# Patient Record
Sex: FEMALE | NOT HISPANIC OR LATINO | Employment: UNEMPLOYED | URBAN - METROPOLITAN AREA
[De-identification: names, ages, dates, MRNs, and addresses within clinical notes are randomized per-mention and may not be internally consistent; named-entity substitution may affect disease eponyms.]

---

## 2021-06-08 ENCOUNTER — IMMUNIZATION (OUTPATIENT)
Dept: FAMILY MEDICINE | Facility: CLINIC | Age: 31
End: 2021-06-08
Payer: COMMERCIAL

## 2021-06-08 ENCOUNTER — OFFICE VISIT (OUTPATIENT)
Dept: FAMILY MEDICINE | Facility: CLINIC | Age: 31
End: 2021-06-08
Payer: COMMERCIAL

## 2021-06-08 VITALS
TEMPERATURE: 98.5 F | DIASTOLIC BLOOD PRESSURE: 81 MMHG | HEART RATE: 76 BPM | WEIGHT: 138.4 LBS | RESPIRATION RATE: 20 BRPM | OXYGEN SATURATION: 99 % | SYSTOLIC BLOOD PRESSURE: 113 MMHG | HEIGHT: 58 IN | BODY MASS INDEX: 29.05 KG/M2

## 2021-06-08 DIAGNOSIS — J30.2 SEASONAL ALLERGIC RHINITIS, UNSPECIFIED TRIGGER: ICD-10-CM

## 2021-06-08 DIAGNOSIS — H60.8X1 OTHER OTITIS EXTERNA, RIGHT EAR: ICD-10-CM

## 2021-06-08 DIAGNOSIS — N91.2 ABSENCE OF MENSTRUATION: Primary | ICD-10-CM

## 2021-06-08 LAB — HCG UR QL: NEGATIVE

## 2021-06-08 PROCEDURE — 81025 URINE PREGNANCY TEST: CPT | Performed by: FAMILY MEDICINE

## 2021-06-08 PROCEDURE — 99214 OFFICE O/P EST MOD 30 MIN: CPT | Performed by: FAMILY MEDICINE

## 2021-06-08 PROCEDURE — 91301 PR COVID VAC MODERNA 100 MCG/0.5 ML IM: CPT

## 2021-06-08 PROCEDURE — 0011A PR COVID VAC MODERNA 100 MCG/0.5 ML IM: CPT

## 2021-06-08 RX ORDER — CETIRIZINE HYDROCHLORIDE 10 MG/1
10 TABLET ORAL
Qty: 30 TABLET | Refills: 1 | Status: SHIPPED | OUTPATIENT
Start: 2021-06-08 | End: 2022-01-24

## 2021-06-08 RX ORDER — CIPROFLOXACIN AND DEXAMETHASONE 3; 1 MG/ML; MG/ML
4 SUSPENSION/ DROPS AURICULAR (OTIC) 2 TIMES DAILY
Qty: 2 ML | Refills: 0 | Status: SHIPPED | OUTPATIENT
Start: 2021-06-08 | End: 2021-06-13

## 2021-06-08 RX ORDER — FLUTICASONE PROPIONATE 50 MCG
1 SPRAY, SUSPENSION (ML) NASAL DAILY
Qty: 16 G | Refills: 1 | Status: SHIPPED | OUTPATIENT
Start: 2021-06-08 | End: 2022-01-24

## 2021-06-08 SDOH — HEALTH STABILITY: MENTAL HEALTH: HOW MANY STANDARD DRINKS CONTAINING ALCOHOL DO YOU HAVE ON A TYPICAL DAY?: NOT ASKED

## 2021-06-08 SDOH — HEALTH STABILITY: MENTAL HEALTH: HOW OFTEN DO YOU HAVE 6 OR MORE DRINKS ON ONE OCCASION?: NEVER

## 2021-06-08 SDOH — HEALTH STABILITY: MENTAL HEALTH: HOW OFTEN DO YOU HAVE A DRINK CONTAINING ALCOHOL?: NEVER

## 2021-06-08 ASSESSMENT — MIFFLIN-ST. JEOR: SCORE: 1236.78

## 2021-06-21 ENCOUNTER — OFFICE VISIT (OUTPATIENT)
Dept: FAMILY MEDICINE | Facility: CLINIC | Age: 31
End: 2021-06-21
Payer: COMMERCIAL

## 2021-06-21 VITALS
WEIGHT: 137.8 LBS | HEIGHT: 59 IN | DIASTOLIC BLOOD PRESSURE: 83 MMHG | SYSTOLIC BLOOD PRESSURE: 116 MMHG | TEMPERATURE: 98.4 F | BODY MASS INDEX: 27.78 KG/M2 | HEART RATE: 86 BPM | OXYGEN SATURATION: 96 % | RESPIRATION RATE: 18 BRPM

## 2021-06-21 DIAGNOSIS — R06.2 WHEEZING: Primary | ICD-10-CM

## 2021-06-21 DIAGNOSIS — R30.0 DYSURIA: ICD-10-CM

## 2021-06-21 DIAGNOSIS — N92.6 IRREGULAR MENSES: ICD-10-CM

## 2021-06-21 LAB
BILIRUBIN UR: NEGATIVE MG/DL
BLOOD UR: NEGATIVE MG/DL
GLUCOSE URINE: NEGATIVE
KETONES UR QL: NEGATIVE MG/DL
LEUKOCYTE ESTERASE UR: NEGATIVE
NITRITE UR QL STRIP: NEGATIVE MG/DL
PH UR STRIP: 5.5 [PH] (ref 4.5–8)
PROTEIN UR: NEGATIVE MG/DL
SP GR UR STRIP: 1.01 (ref 1–1.03)
UROBILINOGEN UR STRIP-ACNC: NORMAL E.U./DL

## 2021-06-21 PROCEDURE — 99214 OFFICE O/P EST MOD 30 MIN: CPT | Mod: GC | Performed by: STUDENT IN AN ORGANIZED HEALTH CARE EDUCATION/TRAINING PROGRAM

## 2021-06-21 PROCEDURE — 81003 URINALYSIS AUTO W/O SCOPE: CPT | Performed by: STUDENT IN AN ORGANIZED HEALTH CARE EDUCATION/TRAINING PROGRAM

## 2021-06-21 RX ORDER — ALBUTEROL SULFATE 90 UG/1
2 AEROSOL, METERED RESPIRATORY (INHALATION) EVERY 6 HOURS
Qty: 18 G | Refills: 3 | Status: SHIPPED | OUTPATIENT
Start: 2021-06-21 | End: 2022-01-24

## 2021-06-21 ASSESSMENT — MIFFLIN-ST. JEOR: SCORE: 1249.69

## 2021-06-21 NOTE — PROGRESS NOTES
"       SUBJECTIVE       Mami Lyn is a 30 year old  female with a PMH     Irregular Period:  Mami is here today for an irregular period. She was initially seen on 6/8 for a missed period. Her period started 6/12 and stopped on 6/17. She also felt that the period was heavier than.     Chronic Cough:  She also reports that she has increased asthma symptoms in the last few weeks. She reports that symptoms are worse at night. She recently moved from [place] and reports that she was treated with an albuterol inhaler while living there. She denies any previously hospitalizations for her breathing. She has felt more fatigues    Dysuria:  Patient also reports burning with urination and some pelvic discomfort. No back or flank pain. No fever or chills. She reports a PMH of UTIs.     PMH, Medications and Allergies were reviewed and updated as needed.          OBJECTIVE     Vitals:    06/21/21 1100   BP: 116/83   Pulse: 86   Resp: 18   Temp: 98.4  F (36.9  C)   TempSrc: Oral   SpO2: 96%   Weight: 62.5 kg (137 lb 12.8 oz)   Height: 1.505 m (4' 11.25\")     Body mass index is 27.6 kg/m .    General :  healthy and alert, no distress  HEENT:  PERRLA, oropharynx is without erythema.   Cardiovascular: regular rate and rhythm, normal S1/S2 no other heart sounds  Respiratory:  CTA, normal respiratory effort  Musculoskeletal: no edema, moves all fours  Skin:   no lesions or rashes on exposed skin  Gastrointestinal:       abdomen soft, non-tender, non-distended, no organomegaly or masses, normal bowel sounds, no CVA tenderness.     No results found for this or any previous visit (from the past 24 hour(s)).    ASSESSMENT AND PLAN       Mami was seen today for menstrual problem.    Diagnoses and all orders for this visit:    Wheezing: Unclear diagnosis but will treat with albuterol today and schedule for PFTs. She will need a medical ride.   -     Pulmonary Function Test (PFT) Referral; Future  -     albuterol (PROAIR HFA/PROVENTIL HFA/VENTOLIN " HFA) 108 (90 Base) MCG/ACT inhaler; Inhale 2 puffs into the lungs every 6 hours    Dysuria: Today we will test the urine, she chose to wait for abx tx until the culture returns. We discussed that if she develops fever or flank/back pain she should follow-up sooner.   -     Urinalysis, Micro If ([facility])  -     Urine Culture ([facility])    Irregular menses  Reassured that periods can occasionally be irregular, and that a period lasting 5 days is reassuring.     F/u for CPE when able.    Discussed with MD Sunday Ramos MD PGY 3  [facility] [place]

## 2021-06-21 NOTE — NURSING NOTE
Due to patient being non-English speaking/uses sign language, an  was used for this visit. Only for face-to-face interpretation by an external agency, date and length of interpretation can be found on the scanned worksheet.     name:           ID:[id number]  Agency: [facility] [employer] Phone Interpreting  Language: Enma   Telephone number: [phone]  Type of interpretation: Telephone, spoken

## 2021-06-21 NOTE — LETTER
RETURN TO WORK/SCHOOL FORM    6/21/2021    Re: Mami Lyn  1990      To Whom It May Concern:     Mami Lyn was seen in clinic today..  She may return to work without restrictions on 6/22/21         Sunday Colorado MD  6/21/2021 11:32 AM

## 2021-06-21 NOTE — PROGRESS NOTES
"Preceptor attestation:  Vital signs reviewed: /83   Pulse 86   Temp 98.4  F (36.9  C) (Oral)   Resp 18   Ht 1.505 m (4' 11.25\")   Wt 62.5 kg (137 lb 12.8 oz)   LMP 06/11/2021 (Approximate)   SpO2 96%   BMI 27.60 kg/m      Patient seen, evaluated, and discussed with the resident.  I have verified the content of the note, which accurately reflects my assessment of the patient and the plan of care.    Supervising physician: Kena López MD  [facility]  "

## 2021-06-22 LAB — CULTURE: NORMAL

## 2021-06-23 NOTE — PATIENT INSTRUCTIONS
06/23/21   Pulmonary Function Test   [facility]  [address], [address]   [place] [address]  Phone: [phone]  Fax: [phone]    Fax referral, demographics, medication list and office notes to [phone]    Arely Barroso

## 2021-06-24 ENCOUNTER — AMBULATORY - HEALTHEAST (OUTPATIENT)
Dept: PULMONOLOGY | Facility: OTHER | Age: 31
End: 2021-06-24

## 2021-06-24 DIAGNOSIS — R05.3 CHRONIC COUGH: ICD-10-CM

## 2021-06-24 DIAGNOSIS — R06.2 WHEEZING: ICD-10-CM

## 2021-07-06 ENCOUNTER — IMMUNIZATION (OUTPATIENT)
Dept: FAMILY MEDICINE | Facility: CLINIC | Age: 31
End: 2021-07-06
Attending: FAMILY MEDICINE
Payer: COMMERCIAL

## 2021-07-06 PROCEDURE — 91301 PR COVID VAC MODERNA 100 MCG/0.5 ML IM: CPT

## 2021-07-06 PROCEDURE — 0012A PR COVID VAC MODERNA 100 MCG/0.5 ML IM: CPT

## 2022-01-24 ENCOUNTER — OFFICE VISIT (OUTPATIENT)
Dept: FAMILY MEDICINE | Facility: CLINIC | Age: 32
End: 2022-01-24

## 2022-01-24 VITALS
TEMPERATURE: 98.3 F | BODY MASS INDEX: 26.27 KG/M2 | WEIGHT: 131.2 LBS | RESPIRATION RATE: 18 BRPM | HEART RATE: 100 BPM | OXYGEN SATURATION: 95 % | SYSTOLIC BLOOD PRESSURE: 113 MMHG | DIASTOLIC BLOOD PRESSURE: 74 MMHG

## 2022-01-24 DIAGNOSIS — J30.2 SEASONAL ALLERGIC RHINITIS, UNSPECIFIED TRIGGER: ICD-10-CM

## 2022-01-24 DIAGNOSIS — R42 DIZZINESS: ICD-10-CM

## 2022-01-24 DIAGNOSIS — J45.20 MILD INTERMITTENT ASTHMA WITHOUT COMPLICATION: Primary | ICD-10-CM

## 2022-01-24 PROCEDURE — 99214 OFFICE O/P EST MOD 30 MIN: CPT | Mod: GC | Performed by: STUDENT IN AN ORGANIZED HEALTH CARE EDUCATION/TRAINING PROGRAM

## 2022-01-24 RX ORDER — FLUTICASONE PROPIONATE 50 MCG
1 SPRAY, SUSPENSION (ML) NASAL DAILY
Qty: 16 G | Refills: 1 | Status: SHIPPED | OUTPATIENT
Start: 2022-01-24 | End: 2022-03-28

## 2022-01-24 RX ORDER — CETIRIZINE HYDROCHLORIDE 10 MG/1
10 TABLET ORAL
Qty: 30 TABLET | Refills: 1 | Status: SHIPPED | OUTPATIENT
Start: 2022-01-24 | End: 2022-03-28

## 2022-01-24 RX ORDER — MECLIZINE HYDROCHLORIDE 25 MG/1
25 TABLET ORAL 3 TIMES DAILY PRN
Qty: 30 TABLET | Refills: 0 | Status: CANCELLED | OUTPATIENT
Start: 2022-01-24

## 2022-01-24 RX ORDER — BUDESONIDE AND FORMOTEROL FUMARATE DIHYDRATE 80; 4.5 UG/1; UG/1
2 AEROSOL RESPIRATORY (INHALATION) 2 TIMES DAILY PRN
Qty: 20.4 G | Refills: 11 | Status: SHIPPED | OUTPATIENT
Start: 2022-01-24 | End: 2022-03-07

## 2022-01-24 NOTE — LETTER
RETURN TO WORK/SCHOOL FORM    1/24/2022    Re: Mami Lyn  1990      To Whom It May Concern:     Mami Lyn was seen in clinic today.  She may return to work without restrictions on 1/25/22.           Arely Harris MD  1/24/2022 5:24 PM

## 2022-01-24 NOTE — PROGRESS NOTES
[facility] Clinic Visit    Assessment & Plan   1. Seasonal allergic rhinitis, unspecified trigger  Refilled.  - cetirizine (ZYRTEC) 10 MG tablet; Take 1 tablet (10 mg) by mouth nightly as needed (itching)  Dispense: 30 tablet; Refill: 1  - fluticasone (FLONASE) 50 MCG/ACT nasal spray; Spray 1 spray into both nostrils daily During allergy season  Dispense: 16 g; Refill: 1    2. Mild intermittent asthma without complication  Per RAMIRO guidelines, sent symbicort and discontinued albuterol.  - budesonide-formoterol (SYMBICORT) 80-4.5 MCG/ACT Inhaler; Inhale 2 puffs into the lungs 2 times daily as needed (for cough or wheezing)  Dispense: 20.4 g; Refill: 11    3. Dizziness  Patient presenting with 2-year history of dizziness that is worse with movement and described as room spinning.  I do suspect that this is vertigo, but given her longstanding history and no laboratory work-up in our system, would recommend basic labs and imaging. Ordered CBC/CMP/TSH to rule out anemia or electrolyte imbalance, thyroid dysfunction.  She also had question of dysmetria with finger-nose testing versus mild tremor, thus will obtain MRI brain.  Placed ENT referral given questionable hearing loss as well.  - CBC with platelets; Future  - TSH with free T4 reflex; Future  - Comprehensive metabolic panel; Future  - MR Brain w/o & w Contrast; Future  - Otolaryngology Referral; Future         Options for treatment and follow-up care were reviewed with the patient who was engaged and actively involved in the decision making process, verbalized understanding of the options discussed, and satisfied with the final plan.    Patient was staffed with supervising physician, Dr. Griffin.     Arely Harris MD, PGY3  [facility]    Subjective   Mami Lyn is a 31 year old female who presents with dizziness.    Dizziness/Vertigo  Onset: Developed when she moved up here from [place], approximately two years ago. She is dizzy all  "the time. It doesn't matter if she is sitting, standing, or walking. She feels dizzy sitting here and reports she can't look far because that makes the dizziness worse. She needs to  her glasses in three weeks.   What brings it on? Movement    Description:   Do you feel like you are going to faint?:  No   Does it feel like the surroundings (bed, room) are moving?:  YES   Have you felt unsteady/off balance?: No   Have you passed out or fallen?: No     Intensity: Moderate    Progression of Symptoms:  Worsening    Accompanying Signs & Symptoms:  No diarrhea or constipation.   Urinary Symptoms: No  Mood: No anxiety, stress, or depressed mood  Heart palpitations:  YES - sometimes  Nausea, vomiting:  YES - always the week before she gets her period, once she gets her period it goes away  Weakness in arms or legs: No   Fatigue:  YES - \"heart is heavy\"  Vision or speech changes:  YES - getting new glasses   Ringing in ears (Tinnitus): No   Hearing Loss:  YES - notices \"air in her ears\" and then can't hear    History:   Head trauma/concussion hx: No   Previous similar symptoms:  YES two year history  Recent bleeding history:  YES - last week and today had a nosebleed. Lasted approximately 5 minutes each time.   History of irregular periods. LMP on 1/19/22. She still has her period. It is heavy.    Precipitating factors:   Worse with activity or head movement?:  YES   Any new medications (BP?): No   Alcohol/drug abuse/withdrawal: No     Alleviating factors:   Does staying in a fixed position give relief?: Yes   Therapies Tried and outcome: Nothing    Needs refill on allergy and asthma medications. Only uses albuterol per patient. Uses as needed.     Social: She reports that she has never smoked. She uses smokeless tobacco. She reports that she does not drink alcohol and does not use drugs.    There are no exam notes on file for this visit.    Objective     Vitals:    01/24/22 1629   BP: 113/74   Pulse: 100   Resp: 18 "   Temp: 98.3  F (36.8  C)   TempSrc: Oral   SpO2: 95%   Weight: 59.5 kg (131 lb 3.2 oz)     Body mass index is 26.27 kg/m .    GEN: NAD, healthy, alert  Constitutional: Awake, alert, cooperative, no acute distress, and appears stated age.  HEENT: NC/AT, clear oropharynx, MMM  Eyes: EOMI, sclera clear, conjunctiva normal.  No nystagmus.  ENT: TMs clear bilaterally. Tonsils nonerythematous and without exudates or trismus.  Neck: Supple, symmetrical, trachea midline. No submandibular LAD.  Back: Symmetric, no curvature  Lungs: No increased WOB. CTABL, no crackles or wheezing appreciated.  Cardiovascular: Appears well perfused. RRR, normal S1 and S2, no S3 or S4, and no murmur appreciated.  Abdomen: Normal BS, soft, non-distended, non-tender, no masses palpated  Musculoskeletal: No redness, warmth, or swelling of the joints. Tone is normal.  Neurologic: A&Ox3.  Cranial nerves II-XII are intact.  Sensory is intact and gait is normal. Finger-nose-finger there is mild dysmetria.  Negative Romberg.  Neuropsychiatric: Normal affect, mood, orientation, memory and insight.  Skin: No visible rashes, erythema, pallor, petechia or purpura.

## 2022-01-24 NOTE — PROGRESS NOTES
Preceptor Attestation:    I discussed the patient with the resident and evaluated the patient in person. I have verified the content of the note, which accurately reflects my assessment of the patient and the plan of care.   Supervising Physician:  Donnie Griffin MD.

## 2022-02-11 DIAGNOSIS — H90.5 SENSORINEURAL HEARING LOSS (SNHL), UNSPECIFIED LATERALITY: Primary | ICD-10-CM

## 2022-02-23 ENCOUNTER — IMMUNIZATION (OUTPATIENT)
Dept: FAMILY MEDICINE | Facility: CLINIC | Age: 32
End: 2022-02-23
Payer: COMMERCIAL

## 2022-02-23 PROCEDURE — 0064A COVID-19,PF,MODERNA (18+ YRS BOOSTER .25ML): CPT

## 2022-02-23 PROCEDURE — 91306 COVID-19,PF,MODERNA (18+ YRS BOOSTER .25ML): CPT

## 2022-03-07 ENCOUNTER — OFFICE VISIT (OUTPATIENT)
Dept: FAMILY MEDICINE | Facility: CLINIC | Age: 32
End: 2022-03-07

## 2022-03-07 ENCOUNTER — ANCILLARY PROCEDURE (OUTPATIENT)
Dept: GENERAL RADIOLOGY | Facility: CLINIC | Age: 32
End: 2022-03-07
Attending: STUDENT IN AN ORGANIZED HEALTH CARE EDUCATION/TRAINING PROGRAM

## 2022-03-07 VITALS
OXYGEN SATURATION: 96 % | TEMPERATURE: 98.8 F | RESPIRATION RATE: 16 BRPM | DIASTOLIC BLOOD PRESSURE: 90 MMHG | BODY MASS INDEX: 24.03 KG/M2 | SYSTOLIC BLOOD PRESSURE: 148 MMHG | WEIGHT: 120 LBS | HEART RATE: 95 BPM

## 2022-03-07 DIAGNOSIS — R07.89 CHEST WALL PAIN: ICD-10-CM

## 2022-03-07 DIAGNOSIS — R07.9 CHEST PAIN, UNSPECIFIED TYPE: Primary | ICD-10-CM

## 2022-03-07 DIAGNOSIS — R42 DIZZINESS: ICD-10-CM

## 2022-03-07 DIAGNOSIS — D69.6 THROMBOCYTOPENIA (H): ICD-10-CM

## 2022-03-07 DIAGNOSIS — J45.20 MILD INTERMITTENT ASTHMA WITHOUT COMPLICATION: ICD-10-CM

## 2022-03-07 DIAGNOSIS — R07.9 CHEST PAIN, UNSPECIFIED TYPE: ICD-10-CM

## 2022-03-07 LAB
ALBUMIN SERPL-MCNC: 3.4 G/DL (ref 3.5–5)
ALP SERPL-CCNC: 122 U/L (ref 45–120)
ALT SERPL W P-5'-P-CCNC: 152 U/L (ref 0–45)
ANION GAP SERPL CALCULATED.3IONS-SCNC: 14 MMOL/L (ref 5–18)
AST SERPL W P-5'-P-CCNC: 295 U/L (ref 0–40)
BILIRUB SERPL-MCNC: 1 MG/DL (ref 0–1)
BUN SERPL-MCNC: 4 MG/DL (ref 8–22)
CALCIUM SERPL-MCNC: 8.7 MG/DL (ref 8.5–10.5)
CHLORIDE BLD-SCNC: 102 MMOL/L (ref 98–107)
CO2 SERPL-SCNC: 21 MMOL/L (ref 22–31)
CREAT SERPL-MCNC: 0.67 MG/DL (ref 0.6–1.1)
D DIMER PPP FEU-MCNC: 0.98 UG/ML FEU (ref 0–0.5)
ERYTHROCYTE [DISTWIDTH] IN BLOOD BY AUTOMATED COUNT: 12.2 % (ref 10–15)
GFR SERPL CREATININE-BSD FRML MDRD: >90 ML/MIN/1.73M2
GLUCOSE BLD-MCNC: 124 MG/DL (ref 70–125)
HCT VFR BLD AUTO: 40.4 % (ref 35–47)
HGB BLD-MCNC: 13.5 G/DL (ref 11.7–15.7)
MCH RBC QN AUTO: 32.1 PG (ref 26.5–33)
MCHC RBC AUTO-ENTMCNC: 33.4 G/DL (ref 31.5–36.5)
MCV RBC AUTO: 96 FL (ref 78–100)
PLATELET # BLD AUTO: 128 10E3/UL (ref 150–450)
POTASSIUM BLD-SCNC: 4.1 MMOL/L (ref 3.5–5)
PROT SERPL-MCNC: 8.2 G/DL (ref 6–8)
RBC # BLD AUTO: 4.2 10E6/UL (ref 3.8–5.2)
SODIUM SERPL-SCNC: 137 MMOL/L (ref 136–145)
TROPONIN I SERPL-MCNC: <0.01 NG/ML (ref 0–0.29)
TSH SERPL DL<=0.005 MIU/L-ACNC: 1.15 UIU/ML (ref 0.3–5)
WBC # BLD AUTO: 3.6 10E3/UL (ref 4–11)

## 2022-03-07 PROCEDURE — 84443 ASSAY THYROID STIM HORMONE: CPT | Performed by: STUDENT IN AN ORGANIZED HEALTH CARE EDUCATION/TRAINING PROGRAM

## 2022-03-07 PROCEDURE — 71101 X-RAY EXAM UNILAT RIBS/CHEST: CPT | Mod: RT | Performed by: RADIOLOGY

## 2022-03-07 PROCEDURE — 36415 COLL VENOUS BLD VENIPUNCTURE: CPT | Performed by: STUDENT IN AN ORGANIZED HEALTH CARE EDUCATION/TRAINING PROGRAM

## 2022-03-07 PROCEDURE — 85379 FIBRIN DEGRADATION QUANT: CPT | Performed by: STUDENT IN AN ORGANIZED HEALTH CARE EDUCATION/TRAINING PROGRAM

## 2022-03-07 PROCEDURE — 85027 COMPLETE CBC AUTOMATED: CPT | Performed by: STUDENT IN AN ORGANIZED HEALTH CARE EDUCATION/TRAINING PROGRAM

## 2022-03-07 PROCEDURE — 99214 OFFICE O/P EST MOD 30 MIN: CPT | Mod: GC | Performed by: STUDENT IN AN ORGANIZED HEALTH CARE EDUCATION/TRAINING PROGRAM

## 2022-03-07 PROCEDURE — 80053 COMPREHEN METABOLIC PANEL: CPT | Performed by: STUDENT IN AN ORGANIZED HEALTH CARE EDUCATION/TRAINING PROGRAM

## 2022-03-07 PROCEDURE — 93000 ELECTROCARDIOGRAM COMPLETE: CPT | Performed by: STUDENT IN AN ORGANIZED HEALTH CARE EDUCATION/TRAINING PROGRAM

## 2022-03-07 PROCEDURE — 84484 ASSAY OF TROPONIN QUANT: CPT | Performed by: STUDENT IN AN ORGANIZED HEALTH CARE EDUCATION/TRAINING PROGRAM

## 2022-03-07 RX ORDER — BUDESONIDE AND FORMOTEROL FUMARATE DIHYDRATE 80; 4.5 UG/1; UG/1
AEROSOL RESPIRATORY (INHALATION)
Qty: 20.4 G | Refills: 11 | Status: SHIPPED | OUTPATIENT
Start: 2022-03-07 | End: 2022-03-28

## 2022-03-07 RX ORDER — NAPROXEN 500 MG/1
500 TABLET ORAL 2 TIMES DAILY WITH MEALS
Qty: 14 TABLET | Refills: 0 | Status: SHIPPED | OUTPATIENT
Start: 2022-03-07 | End: 2022-03-28

## 2022-03-07 NOTE — PROGRESS NOTES
Preceptor Attestation:    I discussed the patient with the resident and evaluated the patient in person. I have verified the content of the note, which accurately reflects my assessment of the patient and the plan of care.   Supervising Physician:  Donnie Griffin MD.  Preceptor Attestation:   I personally reviewed the imaging and agree with the interpretation documented by the resident. I personally viewed the EKG and agree with the interpretation documented by the resident. I have verified the content of the note, which accurately reflects my assessment of the patient and the plan of care.   Supervising Physician:  Donnie Griffin MD.
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
## 2022-03-07 NOTE — Clinical Note
Please call the patient to assist with scheduling the following things:  1. Brain MRI (ordered at last visit)  2. ENT visit (referral from last visit)  3. Follow up chest pain with Dr. Varghese or Dr. Harris in 1 week    Thank you so much Georgia!    JS

## 2022-03-07 NOTE — Clinical Note
KOURTNEY -- you saw this patient recently. She came into today for chest pain and her whole body was shaking throughout the exam. Anyway, thought you might be interested in seeing lab results as they come through. I will have Georgia call to see if she can follow up with me or you in the next week to review labs and symptoms.     OCTAVIANO

## 2022-03-07 NOTE — NURSING NOTE
Due to patient being non-English speaking/uses sign language, an  was used for this visit. Only for face-to-face interpretation by an external agency, date and length of interpretation can be found on the scanned worksheet.     name: [id number]  Agency:  [facility] [employer] [facility]    Language: Enma   Telephone number: [phone]  Type of interpretation: Telephone, spoken

## 2022-03-07 NOTE — LETTER
March 7, 2022      Mami Lyn  [address]   [place] [address]              To whom it may concern,    Mami Lyn was seen at [facility] on 3/7/22. She will need 1/2 day off of work for necessary medical imaging.           Sincerely,      Trish Varghese MD

## 2022-03-08 ENCOUNTER — HOSPITAL ENCOUNTER (OUTPATIENT)
Dept: CT IMAGING | Facility: CLINIC | Age: 32
Discharge: HOME | End: 2022-03-08
Attending: STUDENT IN AN ORGANIZED HEALTH CARE EDUCATION/TRAINING PROGRAM | Admitting: STUDENT IN AN ORGANIZED HEALTH CARE EDUCATION/TRAINING PROGRAM
Payer: COMMERCIAL

## 2022-03-08 DIAGNOSIS — R07.9 CHEST PAIN, UNSPECIFIED TYPE: ICD-10-CM

## 2022-03-08 DIAGNOSIS — R79.89 POSITIVE D-DIMER: Primary | ICD-10-CM

## 2022-03-08 DIAGNOSIS — R79.89 POSITIVE D-DIMER: ICD-10-CM

## 2022-03-08 PROCEDURE — 71275 CT ANGIOGRAPHY CHEST: CPT

## 2022-03-08 PROCEDURE — 250N000011 HC RX IP 250 OP 636: Performed by: STUDENT IN AN ORGANIZED HEALTH CARE EDUCATION/TRAINING PROGRAM

## 2022-03-08 RX ORDER — IOPAMIDOL 755 MG/ML
100 INJECTION, SOLUTION INTRAVASCULAR ONCE
Status: COMPLETED | OUTPATIENT
Start: 2022-03-08 | End: 2022-03-08

## 2022-03-08 RX ADMIN — IOPAMIDOL 100 ML: 755 INJECTION, SOLUTION INTRAVENOUS at 16:40

## 2022-03-08 NOTE — PROGRESS NOTES
Able to reach patient. Scheduled for CT:    Today, 3/8/22 4:20 pm  [address]   [facility] [address]    Patient has transportation. ./LR

## 2022-03-08 NOTE — PROGRESS NOTES
Chest pain, low prob, positive ddimer. Next step in work up is CTA chest to check for PE.    1. Positive D-dimer  2. Chest pain, unspecified type  Needs outpatient CT PE urgently to rule out PE. Because low risk and stable vitals yesterday I do not believe the patient needs to be seen in the ER emergently unless symptoms progress or change.   - CT Chest Pulmonary Embolism w Contrast; Future  -  If CT positive is positive for PE, would consider starting apixiban 10 mg twice daily for 7 days followed by 5 mg twice daily. NOTE: patient did have abnormal LFTs and thrombocytopenia on labs yesterday. These labs place patient in Child-Burciaga class A or B (do not have PT/INR available). Should be okay to start apixiban.     3. Elevated liver function tests  - Needs close outpatient follow up with Dr. Varghese or Dr. Harris      7:54 am: Called patient with [employer] . Unable to reach patient. Left VM message to call the clinic back. Will route to RN and referral coordinator to try to coordinate urgent imaging.     Discussed with attending physician, Dr. Yañez.    Trish Varghese MD PGY3  [facility]  3/8/2022, 8:04 AM

## 2022-03-08 NOTE — PROGRESS NOTES
Called home number x 2 ([phone]), no answer and phone went straight to voicemil. Called patient contact,  Rox Thein, phone continued to ring without opportunity to leave message.    Spoke with  Bj Herrera who knows the family. He will try to get in touch with them as well. ./LR

## 2022-03-09 NOTE — RESULT ENCOUNTER NOTE
Called patient with Enma  to discuss results. Also reviewed the TSH, CBC, and CMP results that were also collected yesterday.   CMP was significant for elevated LFTs. Recommended close follow up to discuss next steps.     CBC significant for thrombocytopenia.

## 2022-03-15 ENCOUNTER — OFFICE VISIT (OUTPATIENT)
Dept: FAMILY MEDICINE | Facility: CLINIC | Age: 32
End: 2022-03-15

## 2022-03-15 VITALS
SYSTOLIC BLOOD PRESSURE: 118 MMHG | RESPIRATION RATE: 16 BRPM | HEART RATE: 87 BPM | TEMPERATURE: 98.1 F | WEIGHT: 120 LBS | BODY MASS INDEX: 24.03 KG/M2 | OXYGEN SATURATION: 94 % | DIASTOLIC BLOOD PRESSURE: 83 MMHG

## 2022-03-15 DIAGNOSIS — R94.5 ABNORMAL RESULTS OF LIVER FUNCTION STUDIES: ICD-10-CM

## 2022-03-15 DIAGNOSIS — L03.116 CELLULITIS OF LEFT LOWER EXTREMITY: Primary | ICD-10-CM

## 2022-03-15 PROCEDURE — 87186 SC STD MICRODIL/AGAR DIL: CPT | Performed by: STUDENT IN AN ORGANIZED HEALTH CARE EDUCATION/TRAINING PROGRAM

## 2022-03-15 PROCEDURE — 87205 SMEAR GRAM STAIN: CPT | Performed by: STUDENT IN AN ORGANIZED HEALTH CARE EDUCATION/TRAINING PROGRAM

## 2022-03-15 PROCEDURE — 99214 OFFICE O/P EST MOD 30 MIN: CPT | Mod: GC | Performed by: STUDENT IN AN ORGANIZED HEALTH CARE EDUCATION/TRAINING PROGRAM

## 2022-03-15 PROCEDURE — 87070 CULTURE OTHR SPECIMN AEROBIC: CPT | Performed by: STUDENT IN AN ORGANIZED HEALTH CARE EDUCATION/TRAINING PROGRAM

## 2022-03-15 PROCEDURE — 87077 CULTURE AEROBIC IDENTIFY: CPT | Performed by: STUDENT IN AN ORGANIZED HEALTH CARE EDUCATION/TRAINING PROGRAM

## 2022-03-15 RX ORDER — DOXYCYCLINE HYCLATE 100 MG
100 TABLET ORAL 2 TIMES DAILY
Qty: 10 TABLET | Refills: 0 | Status: SHIPPED | OUTPATIENT
Start: 2022-03-15 | End: 2022-03-20

## 2022-03-15 NOTE — NURSING NOTE
Due to patient being non-English speaking/uses sign language, an  was used for this visit. Only for face-to-face interpretation by an external agency, date and length of interpretation can be found on the scanned worksheet.       name: Bj Herrera (Htoo)  Language: Enma  Agency:  [employer]  Phone number: [phone]  Type of interpretation:  Face-to-face, spoken

## 2022-03-15 NOTE — PROGRESS NOTES
Preceptor Attestation:    I discussed the patient with the resident and evaluated the patient in person. I have verified the content of the note, which accurately reflects my assessment of the patient and the plan of care.   Supervising Physician:  Gregory Yañez MD.

## 2022-03-15 NOTE — PROGRESS NOTES
Assessment and Plan   Mami was seen today for wound check and follow-up from her previous visits.  Shallow ulcer on the right calf, 1.5 x 1 inches, tender to touch and surrounded by erythema, no discharge, some granulation tissues on the base of the ulcer.  Multiple pinpoint bug bite on her lower extremities bilateral hand back with some excoriation.  Wound culture was requested for MRSA.  Chest MRI was done recently due to the chest pain which did not reveal PE although D-dimer was mildly elevated.  Chest x-ray did not reveal fracture ribs.  Patient has increased LFT, AST 2 times ALT with low albumin which might suggest it excessive alcohol intake, patient denies.  Patient needs hepatitis C and B testing.  Also needs GGT.  Low platelet might be secondary to toxic effect of alcohol  Chest pain most likely due to muscle strain, patient had complete range of motion.  Also patient had an issue with receiving have prescribed medication due to insurance.  SW was consulted.  Diagnoses and all orders for this visit:    Cellulitis of left lower extremity  -     doxycycline hyclate (VIBRA-TABS) 100 MG tablet; Take 1 tablet (100 mg) by mouth 2 times daily for 5 days  -     Abscess Aerobic Bacterial Culture Routine with Gram Stain    Abnormal results of liver function studies  -     Hepatitis B Surface Antibody  -     Hepatitis B surface antigen  -     Hepatitis B core antibody    Other orders  -     GGT; Standing  -     GGT             Options for treatment and follow-up care were reviewed with the patient. Patient engaged in the decision making process and verbalized understanding of the options discussed and agreed with the final plan.    Patient was staffed with attending physician MD Americo Rock MD PGY2           HPI       Mami Thaw is a 31 year old year old female w/ PMH of   Patient Active Problem List   Diagnosis     Thrombocytopenia (H)    who presents for leg pain and follow-up for the  previous visit lab work.  Patient complaining of right calf pain that started this week.  Initially started as pimple which is open with some clear discharge and produce shallow skin ulcer.  Patient has no history of trauma.  The area is red and painful.  Denies fever or chills.  Patient endorses itching and bug bites on her lower extremities bilateral and on her back.  Regarding shoulder pain that started 2 weeks ago, mainly the right shoulder and right chest that is started after having Covid third shot.  Her pain usually exacerbated with cold and heat, it is intermittent.name.  Patient has a multiple history of heavy lifting using her right arm.        A [employer]  was used for  this visit.    +++++++      Problem, Medication and Allergy Lists were   reviewed and updated if needed.     Patient Active Problem List    Diagnosis Date Noted     Thrombocytopenia (H) 03/07/2022     Priority: Medium     Platelet 128. Found incidentally with work up of chest pain. Needs follow up.          Patient is an established patient of this clinic.         Review of Systems:   10 point ROS negative other than as specified above.         Physical Exam:   /83   Pulse 87   Temp 98.1  F (36.7  C) (Oral)   Resp 16   Wt 54.4 kg (120 lb)   SpO2 94%   BMI 24.03 kg/m      Vitals were reviewed and were normal     Exam:  Constitutional: healthy, alert, no distress, and cooperative  Head: Normocephalic. No masses, lesions, tenderness or abnormalities  Neck: Neck supple. No adenopathy.  ENT: throat normal without erythema or exudate  Cardiovascular: RRR w/o audible murmur  Respiratory: bilateral clear lungs w/o wheezing, crackles or rhonchi; breathing comfortably on RA  Gastrointestinal: Abdomen soft, non-tender. BS normal.  : Deferred  Musculoskeletal: extremities normal- no gross deformities noted,   Skin: Multiple bug bites on the lower extremities bilateral and the back with some sign of excoriation.  Shallow ulcer,  1.5 x 1 inches, erythema around the ulcer, some granulation tissues on the base of the ulcer  Neurologic: grossly normal CN; normal strength, sensation, & tone  Psychiatric: mentation appears normal and affect normal/bright        Results:    Results from this visitNo results found for any visits on 03/15/22.

## 2022-03-15 NOTE — LETTER
[facility] [address] [place] [address]  Phone: [phone]  Fax: [phone]    March 15, 2022        [address]   [place] [address]          To whom it may concern:    RE: Mami Lyn    Patient was seen and treated today at our clinic with her  who provided her with transportation and both  missed work.    Please contact me for questions or concerns.      Sincerely,        Americo Esparza MD

## 2022-03-19 LAB
BACTERIA ABSC ANAEROBE+AEROBE CULT: ABNORMAL
GRAM STAIN RESULT: ABNORMAL
GRAM STAIN RESULT: ABNORMAL

## 2022-03-23 ENCOUNTER — TELEPHONE (OUTPATIENT)
Dept: FAMILY MEDICINE | Facility: CLINIC | Age: 32
End: 2022-03-23
Payer: COMMERCIAL

## 2022-03-23 NOTE — TELEPHONE ENCOUNTER
03/23/2022: Care Coordination     I was asked to assist Ms. Lyn this morning who is having trouble getting her medications due to her insurance showing not being active.  I When I look her insurance up on Epic, it show as being current as [facility] FIORELLA care subscriber ID:[id number]   [employer] [id number]     But when I [place] up on [place], no [place] ID shows up so I called [facility] and was told that her plan was active as of 1/1/22. I was told that the pharmacy is entering her date of birth incorrect as:[birth year] instead of 1990. I then called the pharmacy who reported that they are entering the correct date of birth and that they had the wrong subscriber ID. The pharmacist stated that he will call [facility] when he get's more staff in to get Ms. Lyn her medications. He also stated that he would contact Ms. Lyn. I also called Ms. Lyn but was forced to leave a message.           Vance Choe Sr.  Care Coordinator  [address] [address]  [email or web address]   Office: [phone]  Direct: [phone]  [facility]

## 2022-03-28 ENCOUNTER — TELEPHONE (OUTPATIENT)
Dept: FAMILY MEDICINE | Facility: CLINIC | Age: 32
End: 2022-03-28

## 2022-03-28 ENCOUNTER — OFFICE VISIT (OUTPATIENT)
Dept: FAMILY MEDICINE | Facility: CLINIC | Age: 32
End: 2022-03-28
Payer: COMMERCIAL

## 2022-03-28 VITALS
BODY MASS INDEX: 23.95 KG/M2 | OXYGEN SATURATION: 93 % | TEMPERATURE: 98.1 F | DIASTOLIC BLOOD PRESSURE: 85 MMHG | HEART RATE: 78 BPM | WEIGHT: 119.6 LBS | SYSTOLIC BLOOD PRESSURE: 130 MMHG | RESPIRATION RATE: 16 BRPM

## 2022-03-28 DIAGNOSIS — M25.551 HIP PAIN, RIGHT: Primary | ICD-10-CM

## 2022-03-28 DIAGNOSIS — L03.115 CELLULITIS OF RIGHT LOWER EXTREMITY: ICD-10-CM

## 2022-03-28 DIAGNOSIS — J30.2 SEASONAL ALLERGIC RHINITIS, UNSPECIFIED TRIGGER: ICD-10-CM

## 2022-03-28 DIAGNOSIS — J45.20 MILD INTERMITTENT ASTHMA WITHOUT COMPLICATION: ICD-10-CM

## 2022-03-28 PROCEDURE — 99214 OFFICE O/P EST MOD 30 MIN: CPT | Mod: GC | Performed by: STUDENT IN AN ORGANIZED HEALTH CARE EDUCATION/TRAINING PROGRAM

## 2022-03-28 RX ORDER — FLUTICASONE PROPIONATE 50 MCG
1 SPRAY, SUSPENSION (ML) NASAL DAILY
Qty: 16 G | Refills: 1 | Status: SHIPPED | OUTPATIENT
Start: 2022-03-28

## 2022-03-28 RX ORDER — ACETAMINOPHEN 500 MG
500-1000 TABLET ORAL EVERY 6 HOURS PRN
Qty: 30 TABLET | Refills: 1 | Status: SHIPPED | OUTPATIENT
Start: 2022-03-28

## 2022-03-28 RX ORDER — NAPROXEN 500 MG/1
500 TABLET ORAL 2 TIMES DAILY WITH MEALS
Qty: 14 TABLET | Refills: 0 | Status: SHIPPED | OUTPATIENT
Start: 2022-03-28

## 2022-03-28 RX ORDER — BUDESONIDE AND FORMOTEROL FUMARATE DIHYDRATE 80; 4.5 UG/1; UG/1
AEROSOL RESPIRATORY (INHALATION)
Qty: 20.4 G | Refills: 11 | Status: SHIPPED | OUTPATIENT
Start: 2022-03-28

## 2022-03-28 RX ORDER — CETIRIZINE HYDROCHLORIDE 10 MG/1
10 TABLET ORAL
Qty: 30 TABLET | Refills: 1 | Status: SHIPPED | OUTPATIENT
Start: 2022-03-28 | End: 2022-03-31

## 2022-03-28 NOTE — TELEPHONE ENCOUNTER
03/28/2022: Care Coordination     Ms. Lyn came to the office 30 minutes early today so that we could call [place] [facility] to correct the date of birth on her insurance card so that she can get her medications. We called [place] [facility] as well as [facility] only to be told that she will need to she would need to go to the [facility] located at [address] [place], [place] [address] to take care of this problem. As the bldg on [address] is temporally closed.  Ms. Lyn stated that she will go there in the morning.                 Vance Choe [place] [address]  [email or web address]   Office: [phone]  Direct: [phone]  [facility]

## 2022-03-28 NOTE — LETTER
RETURN TO WORK/SCHOOL FORM    3/28/2022    Re: Mami Lyn  1990      To Whom It May Concern:     Mami Lyn was seen in clinic today and her partner Brando Randall.  She may return to work without restrictions on 3/29/22          Restrictions:  None full duty      Sarwat Rasheed MD  3/28/2022 4:44 PM

## 2022-03-28 NOTE — NURSING NOTE
Due to patient being non-English speaking/uses sign language, an  was used for this visit. Only for face-to-face interpretation by an external agency, date and length of interpretation can be found on the scanned worksheet.     name: PB DESAI   Agency: [employer]  Language: Enma   Telephone number:   Type of interpretation: Face-to-face, spoken
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
## 2022-03-31 ENCOUNTER — OFFICE VISIT (OUTPATIENT)
Dept: FAMILY MEDICINE | Facility: CLINIC | Age: 32
End: 2022-03-31

## 2022-03-31 VITALS
OXYGEN SATURATION: 98 % | HEART RATE: 86 BPM | DIASTOLIC BLOOD PRESSURE: 88 MMHG | BODY MASS INDEX: 23.23 KG/M2 | WEIGHT: 116 LBS | TEMPERATURE: 98.5 F | SYSTOLIC BLOOD PRESSURE: 121 MMHG | RESPIRATION RATE: 16 BRPM

## 2022-03-31 DIAGNOSIS — M54.6 ACUTE BILATERAL THORACIC BACK PAIN: Primary | ICD-10-CM

## 2022-03-31 DIAGNOSIS — R07.9 CHEST PAIN, UNSPECIFIED TYPE: ICD-10-CM

## 2022-03-31 DIAGNOSIS — R10.13 DYSPEPSIA: ICD-10-CM

## 2022-03-31 DIAGNOSIS — L29.9 ITCHING: ICD-10-CM

## 2022-03-31 PROCEDURE — 99214 OFFICE O/P EST MOD 30 MIN: CPT | Mod: GC | Performed by: STUDENT IN AN ORGANIZED HEALTH CARE EDUCATION/TRAINING PROGRAM

## 2022-03-31 RX ORDER — TRIAMCINOLONE ACETONIDE 1 MG/G
CREAM TOPICAL
Qty: 30 G | Refills: 11 | Status: SHIPPED | OUTPATIENT
Start: 2022-03-31

## 2022-03-31 RX ORDER — HYDROXYZINE HYDROCHLORIDE 25 MG/1
25 TABLET, FILM COATED ORAL 3 TIMES DAILY PRN
Qty: 90 TABLET | Refills: 0 | Status: SHIPPED | OUTPATIENT
Start: 2022-03-31

## 2022-03-31 RX ORDER — CALCIUM CARBONATE 500 MG/1
1 TABLET, CHEWABLE ORAL 2 TIMES DAILY
Qty: 90 TABLET | Refills: 0 | Status: SHIPPED | OUTPATIENT
Start: 2022-03-31

## 2022-03-31 RX ORDER — TRIAMCINOLONE ACETONIDE 1 MG/G
CREAM TOPICAL 2 TIMES DAILY
Qty: 15 G | Refills: 11 | Status: SHIPPED | OUTPATIENT
Start: 2022-03-31 | End: 2022-03-31

## 2022-03-31 RX ORDER — DOXYCYCLINE HYCLATE 100 MG
TABLET ORAL
COMMUNITY
Start: 2022-03-23

## 2022-03-31 NOTE — PROGRESS NOTES
"  Assessment & Plan     1. Acute bilateral thoracic back pain  2. Chest pain, unspecified type  3. Dyspepsia  Unclear etiology. She does have a history of recurrent chest pain and had an outpatient ACS rule out about a month ago where the EKG was reassuring, with no signs of acute ischemic changes. Trop was negative, but ddimer was elevated. Follow up chest CT was negative for PE. Vitals today were reassuring, however unlike last presentation I was unable to reproduce the pain with palpation. Discussed red flag symptoms that would indicate need for her to go to the emergency department.   - calcium carbonate (TUMS) 500 MG chewable tablet; Take 1 tablet (500 mg) by mouth 2 times daily  Dispense: 90 tablet; Refill: 0  - Close follow up indicated; should be seen by me next week   - Go to the emergency department if worsening chest pain  - If continued chest, back pain--consider CMP (f/u elevated liver enzymes), lipid panel, lipase, GAD7 & PHQ9.     4. Itching  ?atopic dermatitis on the back. Will provide symptomatic treatment.   - hydrOXYzine (ATARAX) 25 MG tablet; Take 1 tablet (25 mg) by mouth 3 times daily as needed for itching or anxiety  Dispense: 90 tablet; Refill: 0  - triamcinolone (KENALOG) 0.1 % external cream; Apply topically to your back 2 times per day.  Dispense: 30 g; Refill: 11      Diagnosis or treatment significantly limited by social determinants of health - Limited English proficiency  Prescription drug management     BMI:   Estimated body mass index is 23.23 kg/m  as calculated from the following:    Height as of 6/21/21: 1.505 m (4' 11.25\").    Weight as of this encounter: 52.6 kg (116 lb).     Patient was seen by and discussed with attending physician, Dr. Gardner.     Trish Varghese MD  [facility]   Mami is a 31 year old who presents for the following health issues     HPI     Pt is here to follow up on burning pain in her back, right side of her chest, " and epigastric/abdomen. This has been occurring for 2 days. It first starts in her back, then to her chest, then to her belly. The pain is not associated with eating.     She has not tried anything to help with the burning pain. She has an inhaler that helps with the burning pain but not for too long.     Review of Systems   Constitutional, HEENT, cardiovascular, pulmonary, gi and gu systems are negative, except as otherwise noted.      Objective    /88 (BP Location: Left arm, Patient Position: Sitting, Cuff Size: Adult Regular)   Pulse 86   Temp 98.5  F (36.9  C) (Oral)   Resp 16   Wt 52.6 kg (116 lb)   SpO2 98%   BMI 23.23 kg/m    Body mass index is 23.23 kg/m .  Physical Exam   GENERAL: alert and no distress  RESP: lungs clear to auscultation - no rales, rhonchi or wheezes  CV: regular rate and rhythm, normal S1 S2, no S3 or S4, no murmur, click or rub, no peripheral edema and peripheral pulses strong.  MS: Does have a mild tremor. No tenderness to palpation of chest wall and paraspinal muscles. No gross musculoskeletal defects noted, no edema  Skin: Many small darkened, scarred spots on the back. Extensive excoriations on the back (see below)        ----- Service Performed and Documented by Resident or Fellow ------

## 2022-03-31 NOTE — PATIENT INSTRUCTIONS
KENALOG CREAM - For itching. Use 2 times a day on the back.     TUMS/CALCIUM CARBONATE - For burning pain in stomach. Use 1-2 times per day as needed.    HYDROXYZINE - Take 1 tablet up to 3 times a day for itching or anxiety.

## 2022-03-31 NOTE — PROGRESS NOTES
Preceptor Attestation:  Vitals:    03/31/22 1632   BP: 121/88   BP Location: Left arm   Patient Position: Sitting   Cuff Size: Adult Regular   Pulse: 86   Resp: 16   Temp: 98.5  F (36.9  C)   TempSrc: Oral   SpO2: 98%   Weight: 52.6 kg (116 lb)          I discussed the patient with the resident and evaluated the patient in person. I have verified the content of the note, which accurately reflects my assessment of the patient and the plan of care.   Supervising Physician:  Loren Gardner MD

## 2022-03-31 NOTE — LETTER
March 31, 2022      Mami DANIELS   [place] [address]        To Whom It May Concern,     Mami Lyn attended clinic here on Mar 31, 2022. Please excuse her for the day for her medical appointment.     Additionally, please excuse her on Monday, April 4th, 2022 as she has necessary follow up medical appointment.     If you have questions or concerns, please call the clinic at the number listed above.    Sincerely,         Trish Varghese MD

## 2022-03-31 NOTE — NURSING NOTE
Due to patient being non-English speaking/uses sign language, an  was used for this visit. Only for face-to-face interpretation by an external agency, date and length of interpretation can be found on the scanned worksheet.     name: Ja Thrasher  Language: Enma  Agency: [employer]  Phone number: [phone]   Type of interpretation: Face-to-face, spoken